# Patient Record
Sex: FEMALE | HISPANIC OR LATINO | URBAN - METROPOLITAN AREA
[De-identification: names, ages, dates, MRNs, and addresses within clinical notes are randomized per-mention and may not be internally consistent; named-entity substitution may affect disease eponyms.]

---

## 2017-11-09 LAB
ABO GROUP BLD: NORMAL
BLD GP AB SCN SERPL QL: NORMAL
C TRACH DNA SPEC QL NAA+PROBE: NORMAL
CF SCREEN 6187: NORMAL
HBA1C MFR BLD: 5.4 % (ref ?–5.8)
HBV SURFACE AG SERPL QL IA: NON REACTIVE
HCV AB S/CO SERPL IA: NON REACTIVE
HIV 1 0 2 IC ZHVIC: NORMAL
N GONORRHOEA DNA SPEC QL NAA+PROBE: NORMAL
PLATELET # BLD AUTO: NORMAL 10*3/UL
RH BLD: NORMAL
RPR SER QL: NON REACTIVE
RUBV IGG SERPL IA-ACNC: NORMAL

## 2018-01-24 LAB — 2ND TRIMESTER 4 SCREEN SERPL-IMP: NEGATIVE

## 2018-02-28 ENCOUNTER — APPOINTMENT (OUTPATIENT)
Dept: OBGYN | Facility: CLINIC | Age: 28
End: 2018-02-28

## 2018-02-28 ENCOUNTER — HOSPITAL ENCOUNTER (OUTPATIENT)
Facility: MEDICAL CENTER | Age: 28
End: 2018-02-28
Attending: OBSTETRICS & GYNECOLOGY | Admitting: OBSTETRICS & GYNECOLOGY
Payer: MEDICAID

## 2018-03-20 ENCOUNTER — HOSPITAL ENCOUNTER (OUTPATIENT)
Facility: MEDICAL CENTER | Age: 28
End: 2018-03-20
Attending: PHYSICIAN ASSISTANT
Payer: COMMERCIAL

## 2018-03-20 ENCOUNTER — INITIAL PRENATAL (OUTPATIENT)
Dept: OBGYN | Facility: CLINIC | Age: 28
End: 2018-03-20

## 2018-03-20 ENCOUNTER — HOSPITAL ENCOUNTER (OUTPATIENT)
Dept: LAB | Facility: MEDICAL CENTER | Age: 28
End: 2018-03-20
Attending: PHYSICIAN ASSISTANT
Payer: COMMERCIAL

## 2018-03-20 DIAGNOSIS — O09.92 SUPERVISION OF HIGH RISK PREGNANCY IN SECOND TRIMESTER: Primary | ICD-10-CM

## 2018-03-20 DIAGNOSIS — O09.92 SUPERVISION OF HIGH RISK PREGNANCY IN SECOND TRIMESTER: ICD-10-CM

## 2018-03-20 DIAGNOSIS — Z98.891 HISTORY OF C-SECTION: ICD-10-CM

## 2018-03-20 LAB
APPEARANCE UR: NORMAL
BILIRUB UR STRIP-MCNC: NORMAL MG/DL
COLOR UR AUTO: NORMAL
GLUCOSE 1H P 50 G GLC PO SERPL-MCNC: 182 MG/DL (ref 70–139)
GLUCOSE UR STRIP.AUTO-MCNC: NORMAL MG/DL
HCT VFR BLD AUTO: 40.1 % (ref 37–47)
HGB BLD-MCNC: 13 G/DL (ref 12–16)
KETONES UR STRIP.AUTO-MCNC: NORMAL MG/DL
LEUKOCYTE ESTERASE UR QL STRIP.AUTO: NORMAL
NITRITE UR QL STRIP.AUTO: NORMAL
PH UR STRIP.AUTO: 6 [PH] (ref 5–8)
PROT UR QL STRIP: NORMAL MG/DL
RBC UR QL AUTO: NORMAL
SP GR UR STRIP.AUTO: 1.02
TREPONEMA PALLIDUM IGG+IGM AB [PRESENCE] IN SERUM OR PLASMA BY IMMUNOASSAY: NON REACTIVE
UROBILINOGEN UR STRIP-MCNC: NORMAL MG/DL

## 2018-03-20 PROCEDURE — 59402 PR NEW OB HIGH RISK: CPT | Performed by: PHYSICIAN ASSISTANT

## 2018-03-20 PROCEDURE — 81002 URINALYSIS NONAUTO W/O SCOPE: CPT | Performed by: PHYSICIAN ASSISTANT

## 2018-03-20 NOTE — PROGRESS NOTES
Pt. Here for NOB visit today.  #[phone]  Pt transfer of care from [place]  Pt. States no complaints  Pharmacy verified  Pt had AFP done  Pt received Flu

## 2018-03-20 NOTE — PROGRESS NOTES
New ob visit. Pt sure of LMP and US at 6wk confirms JERSON 7/9/18. First pregnancy was C/S at term for NRFHT, though pt dilated to 8cm then needed crash C/S and general anesthesia due to spinal not working. Pt educated heavily today, wants TOLAC. Denies PMHx, other SHx. No tob, etoh or drug use. Pt currently denies cramping, bleeding or pain. +FM.     PE: See below. Size c/w dates. Unable to hear FHT by Doppler, so BSUS done with single viable IUP seen, +cardiac activity at 141bpm, +FM.     A/P: 1. IUP at 24w1d - PAP, GC/CT done today. PNL, AFP wnl per records, O pos. 1hr GTT, H/H, RPR slip given today. US next avail. RTC 4 wks. Flu vaccine given at last MD office.   2. Prior C/S x 1 - desires TOLAC (consent signed today), declines BTL - OP report records requested from [place], [place]

## 2018-03-21 ENCOUNTER — TELEPHONE (OUTPATIENT)
Dept: OBGYN | Facility: CLINIC | Age: 28
End: 2018-03-21

## 2018-03-21 DIAGNOSIS — R73.09 ELEVATED GLUCOSE: ICD-10-CM

## 2018-03-21 NOTE — TELEPHONE ENCOUNTER
Notes Recorded by Narda Dahl C.N.M. on 3/21/2018 at 7:59 AM PDT  Please have patient do 3 hr ASAP. Orders placed.    Unable to contact or leave msg left to call back.    3/22/18/ @ 13/15/18 pt notified.   Pt notified of abnormal 1hr gtt and need to do 3hr gtt this time. Pt instructed to fast 8-10 hrs   pt only allow to drink plain water during fasting time. Pt will call lab to schedule an appt. Advised to bring a snack for after the test is done. Pt notified will be staying in the labs for the 3hr. Pt agreed to do it soon this weekend probably. Pt verbalized understanding.

## 2018-03-22 LAB
C TRACH DNA GENITAL QL NAA+PROBE: NEGATIVE
CYTOLOGY REG CYTOL: NORMAL
N GONORRHOEA DNA GENITAL QL NAA+PROBE: NEGATIVE
SPECIMEN SOURCE: NORMAL

## 2018-03-23 ENCOUNTER — HOSPITAL ENCOUNTER (OUTPATIENT)
Dept: LAB | Facility: MEDICAL CENTER | Age: 28
End: 2018-03-23
Attending: NURSE PRACTITIONER
Payer: COMMERCIAL

## 2018-03-23 DIAGNOSIS — R73.09 ELEVATED GLUCOSE: ICD-10-CM

## 2018-03-23 LAB
GLUCOSE 1H P CHAL SERPL-MCNC: 205 MG/DL (ref 65–180)
GLUCOSE 2H P CHAL SERPL-MCNC: 128 MG/DL (ref 65–155)
GLUCOSE 3H P CHAL SERPL-MCNC: 120 MG/DL (ref 65–140)
GLUCOSE BS SERPL-MCNC: 92 MG/DL (ref 65–95)

## 2018-03-29 ENCOUNTER — DATING (OUTPATIENT)
Dept: OBGYN | Facility: CLINIC | Age: 28
End: 2018-03-29

## 2018-03-29 ENCOUNTER — APPOINTMENT (OUTPATIENT)
Dept: RADIOLOGY | Facility: IMAGING CENTER | Age: 28
End: 2018-03-29
Attending: PHYSICIAN ASSISTANT

## 2018-03-29 DIAGNOSIS — O09.92 SUPERVISION OF HIGH RISK PREGNANCY IN SECOND TRIMESTER: ICD-10-CM

## 2018-03-29 PROCEDURE — 76805 OB US >/= 14 WKS SNGL FETUS: CPT | Mod: TC | Performed by: OBSTETRICS & GYNECOLOGY

## 2018-04-20 ENCOUNTER — TELEPHONE (OUTPATIENT)
Dept: OBGYN | Facility: CLINIC | Age: 28
End: 2018-04-20

## 2018-04-20 NOTE — TELEPHONE ENCOUNTER
NAIMA from  who has since relocated. She'd like to know the results of her US.  Reviewed that it was WNL, except for EIF -- explained to her that she was too late [place] to do an AFP, but that the rest of her US looked normal.  Questions were answered.

## 2018-04-20 NOTE — TELEPHONE ENCOUNTER
Pt called triage line stating she is calling in regards to some test results she would like to obtain. Called pt to inform her that her 3 HR GTT were WNL pt also wanted to know her U/S results states she is currently being cared for in [place] but was told her records would not be received by her current provider until 10 to 15 business days. Per consult with Maggie Mckeon C.N.M. Provider would like to talk to pt in regards to results transferred call. Pt verbalized understanding.

## 2018-05-23 VITALS
DIASTOLIC BLOOD PRESSURE: 62 MMHG | SYSTOLIC BLOOD PRESSURE: 108 MMHG | HEIGHT: 60 IN | BODY MASS INDEX: 37.3 KG/M2 | WEIGHT: 190 LBS